# Patient Record
Sex: FEMALE | Race: OTHER
[De-identification: names, ages, dates, MRNs, and addresses within clinical notes are randomized per-mention and may not be internally consistent; named-entity substitution may affect disease eponyms.]

---

## 2020-04-08 PROBLEM — Z00.00 ENCOUNTER FOR PREVENTIVE HEALTH EXAMINATION: Status: ACTIVE | Noted: 2020-04-08

## 2020-05-05 ENCOUNTER — APPOINTMENT (OUTPATIENT)
Dept: BREAST CENTER | Facility: CLINIC | Age: 40
End: 2020-05-05
Payer: COMMERCIAL

## 2020-05-05 VITALS — HEART RATE: 72 BPM | DIASTOLIC BLOOD PRESSURE: 75 MMHG | SYSTOLIC BLOOD PRESSURE: 109 MMHG

## 2020-05-05 VITALS — WEIGHT: 155 LBS | BODY MASS INDEX: 28.52 KG/M2 | HEIGHT: 62 IN

## 2020-05-05 DIAGNOSIS — Z87.09 PERSONAL HISTORY OF OTHER DISEASES OF THE RESPIRATORY SYSTEM: ICD-10-CM

## 2020-05-05 DIAGNOSIS — F17.200 NICOTINE DEPENDENCE, UNSPECIFIED, UNCOMPLICATED: ICD-10-CM

## 2020-05-05 DIAGNOSIS — Z78.9 OTHER SPECIFIED HEALTH STATUS: ICD-10-CM

## 2020-05-05 PROCEDURE — 99204 OFFICE O/P NEW MOD 45 MIN: CPT

## 2020-05-05 RX ORDER — NAPROXEN 500 MG/1
TABLET ORAL
Refills: 0 | Status: ACTIVE | COMMUNITY

## 2020-05-05 RX ORDER — OXYCODONE 20 MG/1
20 TABLET ORAL
Qty: 135 | Refills: 0 | Status: ACTIVE | COMMUNITY
Start: 2020-04-14

## 2020-05-05 RX ORDER — MULTIVITAMIN
TABLET ORAL
Refills: 0 | Status: ACTIVE | COMMUNITY

## 2020-05-05 NOTE — HISTORY OF PRESENT ILLNESS
[FreeTextEntry1] : This is a 39 year old female referred by Dr. Jose Briones for a right breast fibroadenoma. The patient would like this removed as it's getting bigger. She is s/p right 2N2 USGBx pathology shows benign fibroadenoma. \par Denies any trauma, changes to herbs, diet or supplements.\par \par She does SBE. \par She has noticed a change in her right  breast or a right breast lump.\par She has not noticed a change in her nipple or nipple area.\par She has not noticed a change in the skin of the breast.\par She is not experiencing nipple discharge.\par She is not experiencing breast pain.\par She has not noticed a lump or lymph node under the armpit. \par \par BREAST CANCER RISK FACTORS\par Menarche: 15\par Date of LMP: 4/24/2020\par Menopause: pre\par Grav: 4     Para: 1\par Age at first live birth: 31\par Nursed: no\par Hysterectomy: no \par Oophorectomy: no\par OCP: no\par HRT: no \par Last pap/pelvic exam: 2020 WNL\par Related family history: none\par Ashkenazi: no\par Mastery risk assessment: BRCAPRO 9.6%, TCv7 12.1%, TCv8 12/1%, Jacqueline 12.6%\par BRCA testing: no\par Bra size: 38C \par \par Last mammogram: 3/20/2020 right              Location: [facility]\par Report reviewed.                                 Images reviewed on PACS\par Results: BIRADS 4\par scattered fibroglandular tissue. in the right upper inner retroareolar breast, there is a mass which has undergone an interval increase in size. a marker is present within this\par \par Last ultrasound: 3/20/2020 right          Location: [facility]\par Report reviewed.                                 Images reviewed on PACS\par Results: BIRADS 4\par hypoechoic lobulated solid mass measuring 2.2 x 1.3 x 1.4 cm, this has undergone an interval increase in size\par \par Last MRI: never                                            Location:\par Report reviewed.\par

## 2020-05-05 NOTE — PHYSICAL EXAM
[Normocephalic] : normocephalic [Atraumatic] : atraumatic [Supple] : supple [No Supraclavicular Adenopathy] : no supraclavicular adenopathy [Examined in the supine and seated position] : examined in the supine and seated position [No dominant masses] : no dominant masses left breast [Symmetrical] : symmetrical [No Nipple Retraction] : no left nipple retraction [No Nipple Discharge] : no left nipple discharge [No Axillary Lymphadenopathy] : no left axillary lymphadenopathy [No Edema] : no edema [No Rashes] : no rashes [No Ulceration] : no ulceration [de-identified] : in area of patient's concern there is a 2cm firm, rubbery mass which is mobile, tender to palpation

## 2020-05-05 NOTE — ASSESSMENT
[FreeTextEntry1] : This is a 40 yo female with right breast Fibroadenoma wishing it to be removed.  I explained that it does not increase her risk of breast cancer.  We reviewed the option of observation alone. We also reviewed the procedure of excision. We reviewed postop care and recovery. We reviewed the risks of infection, bleeding, hematoma, seroma, deformity, scarring and change of sensation particularly in the nipple with possible loss of sensation and difficulty with breast feeding. I outlined the procedure and what she should expect on the day of surgery. I would like her to have her left breast screening imaging done prior to surgical excision. She understands all of the above and her questions have been answered. She will see either a PCP or our hospitalist for medical clearance.\par \par

## 2020-08-07 ENCOUNTER — APPOINTMENT (OUTPATIENT)
Dept: BREAST CENTER | Facility: CLINIC | Age: 40
End: 2020-08-07

## 2020-09-10 ENCOUNTER — APPOINTMENT (OUTPATIENT)
Dept: BREAST CENTER | Facility: HOSPITAL | Age: 40
End: 2020-09-10

## 2020-11-13 ENCOUNTER — APPOINTMENT (OUTPATIENT)
Dept: BREAST CENTER | Facility: CLINIC | Age: 40
End: 2020-11-13
Payer: COMMERCIAL

## 2020-11-13 VITALS
HEIGHT: 62 IN | HEART RATE: 70 BPM | WEIGHT: 155 LBS | BODY MASS INDEX: 28.52 KG/M2 | DIASTOLIC BLOOD PRESSURE: 64 MMHG | SYSTOLIC BLOOD PRESSURE: 108 MMHG

## 2020-11-13 DIAGNOSIS — Z12.39 ENCOUNTER FOR OTHER SCREENING FOR MALIGNANT NEOPLASM OF BREAST: ICD-10-CM

## 2020-11-13 DIAGNOSIS — R92.2 INCONCLUSIVE MAMMOGRAM: ICD-10-CM

## 2020-11-13 PROCEDURE — 99215 OFFICE O/P EST HI 40 MIN: CPT

## 2020-11-13 NOTE — ASSESSMENT
[FreeTextEntry1] : This is a 41 yo female with right breast Fibroadenoma wishing it to be removed.  I explained that it does not increase her risk of breast cancer.  We reviewed the option of observation alone. We also reviewed the procedure of excision. We reviewed postop care and recovery. We reviewed the risks of infection, bleeding, hematoma, seroma, deformity, scarring and change of sensation particularly in the nipple with possible loss of sensation and difficulty with breast feeding. I outlined the procedure and what she should expect on the day of surgery. I would like her to have her left breast screening imaging done prior to surgical excision. She understands all of the above and her questions have been answered. She will see or our hospitalist for medical clearance and preop covid testing. \par \par

## 2020-11-13 NOTE — PHYSICAL EXAM
[Normocephalic] : normocephalic [Atraumatic] : atraumatic [Supple] : supple [No Supraclavicular Adenopathy] : no supraclavicular adenopathy [Examined in the supine and seated position] : examined in the supine and seated position [Symmetrical] : symmetrical [No dominant masses] : no dominant masses left breast [No Nipple Retraction] : no left nipple retraction [No Nipple Discharge] : no left nipple discharge [No Axillary Lymphadenopathy] : no left axillary lymphadenopathy [No Edema] : no edema [No Rashes] : no rashes [No Ulceration] : no ulceration [de-identified] : in area of patient's concern there is a 3cm firm, rubbery mass which is mobile, tender to palpation

## 2020-11-13 NOTE — HISTORY OF PRESENT ILLNESS
[FreeTextEntry1] : This is a 40 year old female referred by Dr. Jose Briones for a right breast fibroadenoma. The patient would like this removed as it's getting bigger. She is s/p right 2N2 USGBx pathology shows benign fibroadenoma. \par Denies any trauma, changes to herbs, diet or supplements.\par \par She does SBE. \par She has noticed a change in her right  breast or a right breast lump.\par She has not noticed a change in her nipple or nipple area.\par She has not noticed a change in the skin of the breast.\par She is not experiencing nipple discharge.\par She is not experiencing breast pain.\par She has not noticed a lump or lymph node under the armpit. \par \par BREAST CANCER RISK FACTORS\par Menarche: 15\par Date of LMP: 10/23/2020\par Menopause: pre\par Grav: 4     Para: 1\par Age at first live birth: 31\par Nursed: no\par Hysterectomy: no \par Oophorectomy: no\par OCP: no\par HRT: no \par Last pap/pelvic exam: 2020 WNL\par Related family history: none\par Ashkenazi: no\par Mastery risk assessment: BRCAPRO 9.6%, TCv7 12.1%, TCv8 12/1%, Jacqueline 12.6%\par BRCA testing: no\par Bra size: 38C \par \par Last mammogram: 11/2/2020 left              Location: [facility]\par Report reviewed.                                 Images reviewed on PACS\par Results: BIRADS 2\par no evidence of malignancy.\par \par Last ultrasound: 11/2/2020 left          Location: [facility]\par Report reviewed.                                 Images reviewed on PACS\par Results: BIRADS 2\par no evidence of malignancy.\par \par Last MRI: never                                            Location:\par Report reviewed.\par

## 2021-01-04 ENCOUNTER — APPOINTMENT (OUTPATIENT)
Dept: BREAST CENTER | Facility: HOSPITAL | Age: 41
End: 2021-01-04
Payer: COMMERCIAL

## 2021-01-04 PROCEDURE — 14001 TIS TRNFR TRUNK 10.1-30SQCM: CPT

## 2021-01-04 PROCEDURE — 76098 X-RAY EXAM SURGICAL SPECIMEN: CPT | Mod: 26

## 2021-01-04 PROCEDURE — 19120 REMOVAL OF BREAST LESION: CPT | Mod: RT,59

## 2021-01-11 ENCOUNTER — NON-APPOINTMENT (OUTPATIENT)
Age: 41
End: 2021-01-11

## 2021-01-15 ENCOUNTER — APPOINTMENT (OUTPATIENT)
Dept: BREAST CENTER | Facility: CLINIC | Age: 41
End: 2021-01-15
Payer: COMMERCIAL

## 2021-01-15 DIAGNOSIS — D24.1 BENIGN NEOPLASM OF RIGHT BREAST: ICD-10-CM

## 2021-01-15 DIAGNOSIS — Z12.31 ENCOUNTER FOR SCREENING MAMMOGRAM FOR MALIGNANT NEOPLASM OF BREAST: ICD-10-CM

## 2021-01-15 DIAGNOSIS — N60.19 DIFFUSE CYSTIC MASTOPATHY OF UNSPECIFIED BREAST: ICD-10-CM

## 2021-01-15 PROCEDURE — 99024 POSTOP FOLLOW-UP VISIT: CPT

## 2021-01-15 NOTE — ASSESSMENT
[FreeTextEntry1] : doing well\par path reviewed copy given\par plan mg/sono NOV 2021\par f/u after imaging\par She knows to call or return sooner should any concerns or questions arise.\par

## 2021-01-15 NOTE — HISTORY OF PRESENT ILLNESS
[FreeTextEntry1] : This is a 40 year old female referred by Dr. Jose Briones for a right breast fibroadenoma. The patient would like this removed as it's getting bigger. She is s/p right 2N2 USGBx pathology shows benign fibroadenoma. \par Denies any trauma, changes to herbs, diet or supplements. She is s/p right breast ebbx path showed FA. She has no complaints.\par \par She does SBE. \par She has noticed a change in her right  breast or a right breast lump.\par She has not noticed a change in her nipple or nipple area.\par She has not noticed a change in the skin of the breast.\par She is not experiencing nipple discharge.\par She is not experiencing breast pain.\par She has not noticed a lump or lymph node under the armpit. \par \par BREAST CANCER RISK FACTORS\par Menarche: 15\par Date of LMP: 10/23/2020\par Menopause: pre\par Grav: 4     Para: 1\par Age at first live birth: 31\par Nursed: no\par Hysterectomy: no \par Oophorectomy: no\par OCP: no\par HRT: no \par Last pap/pelvic exam: 2020 WNL\par Related family history: none\par Ashkenazi: no\par Mastery risk assessment: BRCAPRO 9.6%, TCv7 12.1%, TCv8 12/1%, Jacqueline 12.6%\par BRCA testing: no\par Bra size: 38C \par \par Last mammogram: 11/2/2020 left              Location: [facility]\par Report reviewed.                                 Images reviewed on PACS\par Results: BIRADS 2\par no evidence of malignancy.\par \par Last ultrasound: 11/2/2020 left          Location: [facility]\par Report reviewed.                                 Images reviewed on PACS\par Results: BIRADS 2\par no evidence of malignancy.\par \par Last MRI: never                                            Location:\par Report reviewed.\par

## 2023-09-14 ENCOUNTER — NON-APPOINTMENT (OUTPATIENT)
Age: 43
End: 2023-09-14

## 2024-01-29 ENCOUNTER — NON-APPOINTMENT (OUTPATIENT)
Age: 44
End: 2024-01-29